# Patient Record
Sex: MALE | Race: WHITE | NOT HISPANIC OR LATINO | Employment: FULL TIME | URBAN - NONMETROPOLITAN AREA
[De-identification: names, ages, dates, MRNs, and addresses within clinical notes are randomized per-mention and may not be internally consistent; named-entity substitution may affect disease eponyms.]

---

## 2024-04-27 ENCOUNTER — OFFICE VISIT (OUTPATIENT)
Dept: URGENT CARE | Facility: CLINIC | Age: 59
End: 2024-04-27
Payer: COMMERCIAL

## 2024-04-27 VITALS
HEART RATE: 94 BPM | WEIGHT: 226.3 LBS | DIASTOLIC BLOOD PRESSURE: 78 MMHG | SYSTOLIC BLOOD PRESSURE: 132 MMHG | BODY MASS INDEX: 32.4 KG/M2 | RESPIRATION RATE: 16 BRPM | HEIGHT: 70 IN | OXYGEN SATURATION: 94 % | TEMPERATURE: 97.6 F

## 2024-04-27 DIAGNOSIS — R07.9 CHEST PAIN, UNSPECIFIED TYPE: ICD-10-CM

## 2024-04-27 DIAGNOSIS — R94.31 ABNORMAL EKG: ICD-10-CM

## 2024-04-27 RX ORDER — ASPIRIN 81 MG/1
324 TABLET, CHEWABLE ORAL ONCE
Status: COMPLETED | OUTPATIENT
Start: 2024-04-27 | End: 2024-04-27

## 2024-04-27 RX ADMIN — ASPIRIN 324 MG: 81 TABLET, CHEWABLE ORAL at 17:27

## 2024-04-27 ASSESSMENT — ENCOUNTER SYMPTOMS
FEVER: 0
SHORTNESS OF BREATH: 0

## 2024-04-28 NOTE — PROGRESS NOTES
Subjective:     CHIEF COMPLAINT  Chief Complaint   Patient presents with    Chest Pain     Pt states feels like its acid reflex, soreness, took Tums and went away, Friday morning came back hard x 1 week       HPI  Heber Flores is a very pleasant 59 y.o. male who presents with chest discomfort that started on Wednesday.  He originally thought that the discomfort was due to heartburn and tried taking Prilosec and Tums with some improvement.  His symptoms have returned, which he reports was primarily epigastric discomfort spreading up into the chest.  He is not currently experiencing shortness of breath and has come to the urgent care to rule out a cardiac origin of symptoms.    REVIEW OF SYSTEMS  Review of Systems   Constitutional:  Negative for fever.   Respiratory:  Negative for shortness of breath.    Cardiovascular:  Positive for chest pain.       PAST MEDICAL HISTORY  There are no problems to display for this patient.      SURGICAL HISTORY   has a past surgical history that includes knee arthroplasty total (Right); knee arthroplasty total (Left); knee arthroscopy (Left); knee arthroscopy (Right); tonsillectomy; and nerve ulnar transfer (Left, 12/28/2023).    ALLERGIES  Allergies   Allergen Reactions    Sulfa Drugs Hives    Sulfate        CURRENT MEDICATIONS  Home Medications       Reviewed by Aidee Reynoso P.A.-C. (Physician Assistant) on 04/27/24 at 1713  Med List Status: <None>     Medication Last Dose Status   B Complex Vitamins (VITAMIN B COMPLEX PO) Taking Active   busPIRone (BUSPAR) 7.5 MG tablet Taking Active   lisinopril (PRINIVIL) 20 MG Tab Taking Active   Multiple Vitamins-Minerals (MULTIVITAMIN GUMMIES ADULTS PO) Not Taking Active   naproxen (NAPROSYN) 250 MG Tab PRN Active                    SOCIAL HISTORY  Social History     Tobacco Use    Smoking status: Some Days     Types: Cigarettes    Smokeless tobacco: Current    Tobacco comments:     Rarely cigarette or cigar,  chews nicotine gum  "  Vaping Use    Vaping Use: Never used   Substance and Sexual Activity    Alcohol use: Yes     Comment: rare    Drug use: Never    Sexual activity: Not on file       FAMILY HISTORY  History reviewed. No pertinent family history.       Objective:     VITAL SIGNS: /78 (BP Location: Right arm, Patient Position: Sitting, BP Cuff Size: Adult)   Pulse 94   Temp 36.4 °C (97.6 °F) (Temporal)   Resp 16   Ht 1.778 m (5' 10\")   Wt 103 kg (226 lb 4.8 oz)   SpO2 94%   BMI 32.47 kg/m²     PHYSICAL EXAM  Physical Exam  Vitals reviewed.   Constitutional:       General: He is not in acute distress.     Appearance: Normal appearance. He is not ill-appearing, toxic-appearing or diaphoretic.   HENT:      Head: Normocephalic and atraumatic.      Mouth/Throat:      Mouth: Mucous membranes are moist.   Eyes:      Conjunctiva/sclera: Conjunctivae normal.      Pupils: Pupils are equal, round, and reactive to light.   Cardiovascular:      Rate and Rhythm: Normal rate.   Pulmonary:      Effort: Pulmonary effort is normal. No respiratory distress.   Skin:     General: Skin is warm and dry.   Neurological:      General: No focal deficit present.      Mental Status: He is alert and oriented to person, place, and time.   Psychiatric:         Attention and Perception: Attention normal.         Mood and Affect: Mood normal. Affect is angry.         Behavior: Behavior is agitated.         Cognition and Memory: Cognition normal.         Judgment: Judgment normal.         Assessment/Plan:     1. Chest pain, unspecified type  - EKG - Clinic Performed  - aspirin (Asa) chewable tab 324 mg    2. Abnormal EKG  -Patient has been advised to be seen at the emergency department immediately.  He has refused an ambulance and left appointment abruptly before signing AMA form.    MDM/Comments:  I have prepared for this visit by personally reviewing the patient's prevous medical records, vitals, and labs including: most recent BMP and GFR.  " Additionally I have reviewed patient's most recent EKG results from 12/2023. Patient has a history of hypertension with a normal blood pressure on physical exam. Patient has stable vital signs and is non-toxic appearing.  EKG performed in office showing ST changes and T-wave inversions in leads II, III, and aVF concerning for infarct.  I personally interpreted EKG results and discussed results with the patient.  Patient was provided 324 mg of oral aspirin at 1730.  Discussed with patient that I would like him to be seen in the emergency department immediately and that I would like to call 911 for him. Advised patient that I do not feel it is safe for him to drive. Patient has declined an ambulance and left the appointment abruptly.  Patient demonstrated understanding of need to be seen in the ER and did agree to be seen at the nearest emergency department immediately.     Differential diagnosis, natural history, supportive care, and indications for immediate follow-up discussed. All questions answered. Patient agrees with the plan of care.    Follow-up as needed if symptoms worsen or fail to improve to PCP, Urgent care or Emergency Room.    I have personally reviewed prior external notes and test results pertinent to today's visit.  I have independently reviewed and interpreted all diagnostics ordered during this urgent care acute visit.   Discussed management options (risks,benefits, and alternatives to treatment). Pt expresses understanding and the treatment plan was agreed upon. Questions were encouraged and answered to pt's satisfaction.    Please note that this dictation was created using voice recognition software. I have made a reasonable attempt to correct obvious errors, but I expect that there are errors of grammar and possibly content that I did not discover before finalizing the note.   no known precautions/limitations